# Patient Record
Sex: FEMALE | Race: BLACK OR AFRICAN AMERICAN | NOT HISPANIC OR LATINO | Employment: OTHER | URBAN - METROPOLITAN AREA
[De-identification: names, ages, dates, MRNs, and addresses within clinical notes are randomized per-mention and may not be internally consistent; named-entity substitution may affect disease eponyms.]

---

## 2019-01-31 ENCOUNTER — HOSPITAL ENCOUNTER (EMERGENCY)
Facility: HOSPITAL | Age: 74
End: 2019-01-31
Attending: EMERGENCY MEDICINE
Payer: MEDICARE

## 2019-01-31 VITALS
HEART RATE: 91 BPM | OXYGEN SATURATION: 98 % | DIASTOLIC BLOOD PRESSURE: 69 MMHG | WEIGHT: 178.81 LBS | TEMPERATURE: 100 F | RESPIRATION RATE: 20 BRPM | SYSTOLIC BLOOD PRESSURE: 142 MMHG

## 2019-01-31 DIAGNOSIS — K85.90 PANCREATITIS: ICD-10-CM

## 2019-01-31 DIAGNOSIS — R10.9 ABDOMINAL PAIN: ICD-10-CM

## 2019-01-31 DIAGNOSIS — R65.10 SIRS (SYSTEMIC INFLAMMATORY RESPONSE SYNDROME): ICD-10-CM

## 2019-01-31 DIAGNOSIS — K80.21 CALCULUS OF GALLBLADDER WITH BILIARY OBSTRUCTION BUT WITHOUT CHOLECYSTITIS: Primary | ICD-10-CM

## 2019-01-31 LAB
ALBUMIN SERPL BCP-MCNC: 4 G/DL
ALP SERPL-CCNC: 105 U/L
ALT SERPL W/O P-5'-P-CCNC: 12 U/L
AMYLASE SERPL-CCNC: 508 U/L
ANION GAP SERPL CALC-SCNC: 15 MMOL/L
AST SERPL-CCNC: 18 U/L
BACTERIA #/AREA URNS AUTO: NORMAL /HPF
BASOPHILS # BLD AUTO: 0.03 K/UL
BASOPHILS NFR BLD: 0.1 %
BILIRUB SERPL-MCNC: 0.7 MG/DL
BILIRUB UR QL STRIP: NEGATIVE
BUN SERPL-MCNC: 11 MG/DL
CALCIUM SERPL-MCNC: 9.9 MG/DL
CHLORIDE SERPL-SCNC: 104 MMOL/L
CLARITY UR REFRACT.AUTO: CLEAR
CO2 SERPL-SCNC: 21 MMOL/L
COLOR UR AUTO: YELLOW
CREAT SERPL-MCNC: 0.8 MG/DL
DIFFERENTIAL METHOD: ABNORMAL
EOSINOPHIL # BLD AUTO: 0.1 K/UL
EOSINOPHIL NFR BLD: 0.4 %
ERYTHROCYTE [DISTWIDTH] IN BLOOD BY AUTOMATED COUNT: 17.4 %
EST. GFR  (AFRICAN AMERICAN): >60 ML/MIN/1.73 M^2
EST. GFR  (NON AFRICAN AMERICAN): >60 ML/MIN/1.73 M^2
GLUCOSE SERPL-MCNC: 107 MG/DL
GLUCOSE UR QL STRIP: NEGATIVE
HCT VFR BLD AUTO: 35.4 %
HGB BLD-MCNC: 11.2 G/DL
HGB UR QL STRIP: ABNORMAL
KETONES UR QL STRIP: ABNORMAL
LACTATE SERPL-SCNC: 1 MMOL/L
LEUKOCYTE ESTERASE UR QL STRIP: ABNORMAL
LIPASE SERPL-CCNC: 309 U/L
LYMPHOCYTES # BLD AUTO: 2.6 K/UL
LYMPHOCYTES NFR BLD: 12.5 %
MCH RBC QN AUTO: 23.4 PG
MCHC RBC AUTO-ENTMCNC: 31.6 G/DL
MCV RBC AUTO: 74 FL
MICROSCOPIC COMMENT: NORMAL
MONOCYTES # BLD AUTO: 1.5 K/UL
MONOCYTES NFR BLD: 7.3 %
NEUTROPHILS # BLD AUTO: 16.3 K/UL
NEUTROPHILS NFR BLD: 79.4 %
NITRITE UR QL STRIP: NEGATIVE
PH UR STRIP: 6 [PH] (ref 5–8)
PLATELET # BLD AUTO: 513 K/UL
PMV BLD AUTO: 10.4 FL
POTASSIUM SERPL-SCNC: 3.1 MMOL/L
PROT SERPL-MCNC: 8.2 G/DL
PROT UR QL STRIP: ABNORMAL
RBC # BLD AUTO: 4.79 M/UL
RBC #/AREA URNS AUTO: 1 /HPF (ref 0–4)
SODIUM SERPL-SCNC: 140 MMOL/L
SP GR UR STRIP: 1.01 (ref 1–1.03)
SQUAMOUS #/AREA URNS AUTO: 7 /HPF
TROPONIN I SERPL DL<=0.01 NG/ML-MCNC: <0.006 NG/ML
URN SPEC COLLECT METH UR: ABNORMAL
UROBILINOGEN UR STRIP-ACNC: NEGATIVE EU/DL
WBC # BLD AUTO: 20.5 K/UL
WBC #/AREA URNS AUTO: 5 /HPF (ref 0–5)

## 2019-01-31 PROCEDURE — 99285 EMERGENCY DEPT VISIT HI MDM: CPT | Mod: 25,ER

## 2019-01-31 PROCEDURE — 80053 COMPREHEN METABOLIC PANEL: CPT | Mod: ER

## 2019-01-31 PROCEDURE — 25500020 PHARM REV CODE 255: Mod: ER | Performed by: EMERGENCY MEDICINE

## 2019-01-31 PROCEDURE — 83690 ASSAY OF LIPASE: CPT | Mod: ER

## 2019-01-31 PROCEDURE — 25000003 PHARM REV CODE 250: Mod: ER | Performed by: PHYSICIAN ASSISTANT

## 2019-01-31 PROCEDURE — 93005 ELECTROCARDIOGRAM TRACING: CPT | Mod: ER

## 2019-01-31 PROCEDURE — 96361 HYDRATE IV INFUSION ADD-ON: CPT | Mod: ER

## 2019-01-31 PROCEDURE — 93010 ELECTROCARDIOGRAM REPORT: CPT | Mod: ,,, | Performed by: INTERNAL MEDICINE

## 2019-01-31 PROCEDURE — 85025 COMPLETE CBC W/AUTO DIFF WBC: CPT | Mod: ER

## 2019-01-31 PROCEDURE — 81000 URINALYSIS NONAUTO W/SCOPE: CPT | Mod: ER

## 2019-01-31 PROCEDURE — 87040 BLOOD CULTURE FOR BACTERIA: CPT

## 2019-01-31 PROCEDURE — 96365 THER/PROPH/DIAG IV INF INIT: CPT | Mod: ER,59

## 2019-01-31 PROCEDURE — 84484 ASSAY OF TROPONIN QUANT: CPT | Mod: ER

## 2019-01-31 PROCEDURE — 93010 EKG 12-LEAD: ICD-10-PCS | Mod: ,,, | Performed by: INTERNAL MEDICINE

## 2019-01-31 PROCEDURE — 82150 ASSAY OF AMYLASE: CPT | Mod: ER

## 2019-01-31 PROCEDURE — 83605 ASSAY OF LACTIC ACID: CPT | Mod: ER

## 2019-01-31 PROCEDURE — 63600175 PHARM REV CODE 636 W HCPCS: Mod: ER | Performed by: PHYSICIAN ASSISTANT

## 2019-01-31 RX ORDER — SODIUM CHLORIDE 9 MG/ML
1000 INJECTION, SOLUTION INTRAVENOUS
Status: COMPLETED | OUTPATIENT
Start: 2019-01-31 | End: 2019-01-31

## 2019-01-31 RX ORDER — FAMOTIDINE 20 MG/1
20 TABLET, FILM COATED ORAL 2 TIMES DAILY
COMMUNITY

## 2019-01-31 RX ORDER — OMEPRAZOLE 20 MG/1
20 CAPSULE, DELAYED RELEASE ORAL DAILY
COMMUNITY

## 2019-01-31 RX ORDER — HYDROCHLOROTHIAZIDE 12.5 MG/1
12.5 CAPSULE ORAL DAILY
COMMUNITY

## 2019-01-31 RX ORDER — SIMVASTATIN 20 MG/1
20 TABLET, FILM COATED ORAL NIGHTLY
COMMUNITY

## 2019-01-31 RX ORDER — SERTRALINE HYDROCHLORIDE 25 MG/1
25 TABLET, FILM COATED ORAL DAILY
COMMUNITY

## 2019-01-31 RX ORDER — LISINOPRIL 5 MG/1
5 TABLET ORAL DAILY
COMMUNITY

## 2019-01-31 RX ADMIN — CEFTRIAXONE 1 G: 1 INJECTION, SOLUTION INTRAVENOUS at 02:01

## 2019-01-31 RX ADMIN — IOHEXOL 75 ML: 350 INJECTION, SOLUTION INTRAVENOUS at 04:01

## 2019-01-31 RX ADMIN — SODIUM CHLORIDE 1000 ML: 0.9 INJECTION, SOLUTION INTRAVENOUS at 01:01

## 2019-01-31 RX ADMIN — IOHEXOL 30 ML: 350 INJECTION, SOLUTION INTRAVENOUS at 04:01

## 2019-01-31 RX ADMIN — SODIUM CHLORIDE 1000 ML: 0.9 INJECTION, SOLUTION INTRAVENOUS at 06:01

## 2019-01-31 NOTE — ED PROVIDER NOTES
Encounter Date: 1/31/2019       History     Chief Complaint   Patient presents with    Back Pain     lower back pain that radiates into her stomach. just finished taking cefuroxime for UTI/kidney infection     72 yo with lower abdominal pain, back pain, and dysuria for several days. Treated several times for urinary tract infection. Last tx with Ceftin. Pain is intermittent and moderate. Denies chest pain or shortness of breath but has increased abdominal pain with deep inspiration          Review of patient's allergies indicates:  No Known Allergies  Past Medical History:   Diagnosis Date    Anxiety     GERD (gastroesophageal reflux disease)     Hypercholesteremia     Hypertension      Past Surgical History:   Procedure Laterality Date    TOTAL KNEE ARTHROPLASTY       No family history on file.  Social History     Tobacco Use    Smoking status: Never Smoker   Substance Use Topics    Alcohol use: No     Frequency: Never    Drug use: No     Review of Systems   Constitutional: Negative for fever.   HENT: Negative for sore throat.    Respiratory: Negative for shortness of breath.    Cardiovascular: Negative for chest pain.   Gastrointestinal: Positive for abdominal pain. Negative for nausea.   Genitourinary: Negative for dysuria.   Musculoskeletal: Positive for back pain.   Skin: Negative for rash.   Neurological: Negative for weakness.   Hematological: Does not bruise/bleed easily.       Physical Exam     Initial Vitals [01/31/19 1247]   BP Pulse Resp Temp SpO2   130/73 105 20 97.8 °F (36.6 °C) 99 %      MAP       --         Vitals:    01/31/19 1247 01/31/19 1518 01/31/19 2002   BP: 130/73 130/72 (!) 142/69   Pulse: 105 81 91   Resp: 20 20 16   Temp: 97.8 °F (36.6 °C) 98.4 °F (36.9 °C) 99.5 °F (37.5 °C)   TempSrc: Oral Oral Oral   SpO2: 99% 99% 98%   Weight: 81.1 kg (178 lb 12.7 oz)           Physical Exam    Nursing note and vitals reviewed.  Constitutional: She appears well-developed and well-nourished. No  distress.   HENT:   Head: Normocephalic and atraumatic.   Eyes: Conjunctivae and EOM are normal. Pupils are equal, round, and reactive to light.   Neck: Normal range of motion.   Cardiovascular: Normal rate, regular rhythm and normal heart sounds.   Pulmonary/Chest: Breath sounds normal. She has no wheezes. She has no rhonchi. She has no rales. She exhibits no tenderness.   Abdominal: Soft. Bowel sounds are normal. There is tenderness (suprapubic tenderness).   Musculoskeletal: Normal range of motion.   Neurological: She is alert and oriented to person, place, and time. She has normal strength.   Skin: Skin is warm and dry. No rash noted.   Psychiatric: She has a normal mood and affect. Thought content normal.         ED Course   Procedures  Labs Reviewed   URINALYSIS, REFLEX TO URINE CULTURE - Abnormal; Notable for the following components:       Result Value    Protein, UA Trace (*)     Ketones, UA 2+ (*)     Occult Blood UA Trace (*)     Leukocytes, UA 1+ (*)     All other components within normal limits    Narrative:     Preferred Collection Type->Urine, Clean Catch   CBC W/ AUTO DIFFERENTIAL - Abnormal; Notable for the following components:    WBC 20.50 (*)     Hemoglobin 11.2 (*)     Hematocrit 35.4 (*)     MCV 74 (*)     MCH 23.4 (*)     MCHC 31.6 (*)     RDW 17.4 (*)     Platelets 513 (*)     Gran # (ANC) 16.3 (*)     Mono # 1.5 (*)     Gran% 79.4 (*)     Lymph% 12.5 (*)     All other components within normal limits   COMPREHENSIVE METABOLIC PANEL - Abnormal; Notable for the following components:    Potassium 3.1 (*)     CO2 21 (*)     All other components within normal limits   AMYLASE - Abnormal; Notable for the following components:    Amylase 508 (*)     All other components within normal limits   LIPASE - Abnormal; Notable for the following components:    Lipase 309 (*)     All other components within normal limits   CULTURE, BLOOD   CULTURE, BLOOD   URINALYSIS MICROSCOPIC    Narrative:     Preferred  Collection Type->Urine, Clean Catch   TROPONIN I   LACTIC ACID, PLASMA        ECG Results          EKG 12-lead (In process)  Result time 01/31/19 13:38:16    In process by Interface, Lab In [facility] (01/31/19 13:38:16)                 Narrative:    Test Reason : R10.9,    Vent. Rate : 099 BPM     Atrial Rate : 099 BPM     P-R Int : 128 ms          QRS Dur : 064 ms      QT Int : 360 ms       P-R-T Axes : 025 044 017 degrees     QTc Int : 462 ms    Normal sinus rhythm  Normal ECG  No previous ECGs available    Referred By: [email or web address]   SELF           Confirmed By:                   In process by Interface, Lab In [facility] (01/31/19 13:37:08)                 Narrative:    Test Reason : R10.9,    Vent. Rate : 099 BPM     Atrial Rate : 099 BPM     P-R Int : 128 ms          QRS Dur : 064 ms      QT Int : 360 ms       P-R-T Axes : 025 044 017 degrees     QTc Int : 462 ms    Normal sinus rhythm  Normal ECG  No previous ECGs available    Referred By: [email or web address]   SELF           Confirmed By:                             Imaging Results          US Abdomen Limited (Final result)  Result time 01/31/19 17:58:50   Procedure changed from US Abdomen Complete     Final result by Alondra Martinez MD (01/31/19 17:58:50)                 Impression:      Cholelithiasis with a dilated common bile duct to 9 mm.  This can be seen with choledocholithiasis although an obstructing stone is not seen on this exam.  Recommend clinical correlation.  MRCP may be helpful for further evaluation.      Electronically signed by: Alondra Martinez MD  Date:    01/31/2019  Time:    17:58             Narrative:    EXAMINATION:  US ABDOMEN LIMITED    CLINICAL HISTORY:  pancreatitis; Acute pancreatitis without necrosis or infection, unspecified    TECHNIQUE:  Limited ultrasound of the right upper quadrant of the abdomen (including pancreas, liver, gallbladder, common bile duct, and right kidney) was  performed.    COMPARISON:  None.    FINDINGS:  Liver: Normal in size, . Homogeneous echotexture. No focal hepatic lesions.    Gallbladder: Cholelithiasis.  No evidence of acute cholecystitis.    Biliary system: The common duct is dilated to 9 mm.  No intrahepatic ductal dilatation.    Right kidney: Normal in size with no hydronephrosis.    Miscellaneous: No upper abdominal ascites.                               CT Abdomen Pelvis With Contrast (Final result)  Result time 01/31/19 16:29:38    Final result by TETE Meek Sr., MD (01/31/19 16:29:38)                 Impression:      1. Acute pancreatitis.  There are moderate inflammatory changes in and adjacent to the pancreas.  2. There is a moderate size hiatal hernia.  All CT scans at this facility use dose modulation, iterative reconstruction, and/or weight base dosing when appropriate to reduce radiation dose when appropriate to reduce radiation dose to as low as reasonably achievable.      Electronically signed by: Todd Meek MD  Date:    01/31/2019  Time:    16:29             Narrative:    EXAMINATION:  CT ABDOMEN PELVIS WITH CONTRAST    CLINICAL HISTORY:  Abdominal pain, unspecified;pain;    TECHNIQUE:  Standard abdomen and pelvis CT protocol with oral and IV contrast was performed.  75 mL of Omnipaque 350 contrast material was used for this examination.    FINDINGS:  Finding: The size of the heart is within normal limits. The lungs are clear. There is no pneumothorax or pleural effusion.    There are moderate inflammatory changes in and adjacent to the pancreas.  The liver, gallbladder, spleen, adrenals, and kidneys are normal in appearance. The ureters and the urinary bladder are normal in appearance. The appendix is normal in appearance.  There is a moderate size hiatal hernia.  There is a small amount of free fluid in the pelvis.  There is no pneumoperitoneum.                               X-Ray Chest PA And Lateral (Final result)  Result time  01/31/19 13:24:08    Final result by TETE Meek Sr., MD (01/31/19 13:24:08)                 Impression:      1. The lungs are clear.  2. There are findings characteristic of a small hiatal hernia.  3. There are mild degenerative changes in the thoracic spine.  4. There is a mild amount of dextroconvex curvature of the thoracic spine.  .      Electronically signed by: Todd Meek MD  Date:    01/31/2019  Time:    13:24             Narrative:    EXAMINATION:  XR CHEST PA AND LATERAL    CLINICAL HISTORY:  Unspecified abdominal pain    COMPARISON:  None    FINDINGS:  The size and contour of the heart are normal. The lungs are clear.  There are findings characteristic of a small hiatal hernia.  There is no pneumothorax or pleural effusion.  There is a mild amount of dextroconvex curvature of the thoracic spine.  There are mild degenerative changes in the thoracic spine.                                 Medical Decision Making:   ED Management:  Patient was seen and examined in conjunction with the nurse practitioner.  The patient's abdomen is soft and nondistended but markedly tender on my examination. Review of lab work and imaging demonstrates pancreatitis likely biliary in origin given common bile duct dilatation and cholelithiasis.  Accordingly the patient will need hospitalization for IV fluids, bowel rest, and GI consultation for likely ERCP at some point.    All historical, clinical, radiographic, and laboratory findings were reviewed with the patient/family in detail along with the indications for transfer to an outside facility (rather than admission to our facility in [place]) secondary to patient preference and a need for hsospitalization and GI consultation given the diagnosis of cholelithiasis with suspected biliary pancreatitis and subsequent SIRS.  All remaining questions and concerns were addressed at that time and the patient/family communicates understanding and agrees to proceed  accordingly.  Similarly all pertinent details of the encounter were discussed with Dr Roberts at [facility] via the C who agrees to accept the patient in transfer based on the needs/patient preferences outlined above.  Patient will be transferred by [facility] secondary to a need for ongoing cardiac monitoring and IVF en route.  William Samaniego MD  8:13 PM                        Clinical Impression:   The primary encounter diagnosis was Calculus of gallbladder with biliary obstruction but without cholecystitis. Diagnoses of Abdominal pain, Pancreatitis, and SIRS (systemic inflammatory response syndrome) were also pertinent to this visit.                             William Samaniego MD  01/31/19 2017

## 2019-02-01 NOTE — ED NOTES
Pt resting comfortably. Updated on poc. Ambulated to restroom without assistance. Reports 4/10 intermittent back spasms. No abd pain or nausea at this time. Offered pain medication but prefers not to have anything at time.

## 2019-02-01 NOTE — ED NOTES
Transfer center notified that pt accepted by Dr. Sandro Saldivar at [facility]. Contact [phone] for report. Transfer center arranging for transport at this time.

## 2019-02-06 LAB
BACTERIA BLD CULT: NORMAL
BACTERIA BLD CULT: NORMAL

## 2019-03-30 ENCOUNTER — HOSPITAL ENCOUNTER (EMERGENCY)
Facility: HOSPITAL | Age: 74
Discharge: HOME | End: 2019-03-30
Attending: EMERGENCY MEDICINE
Payer: MEDICARE

## 2019-03-30 VITALS
HEART RATE: 67 BPM | HEIGHT: 64 IN | WEIGHT: 177.5 LBS | BODY MASS INDEX: 30.3 KG/M2 | OXYGEN SATURATION: 100 % | DIASTOLIC BLOOD PRESSURE: 78 MMHG | SYSTOLIC BLOOD PRESSURE: 138 MMHG | TEMPERATURE: 98 F | RESPIRATION RATE: 18 BRPM

## 2019-03-30 DIAGNOSIS — N39.0 URINARY TRACT INFECTION WITHOUT HEMATURIA, SITE UNSPECIFIED: Primary | ICD-10-CM

## 2019-03-30 LAB
BACTERIA #/AREA URNS AUTO: ABNORMAL /HPF
BILIRUB UR QL STRIP: NEGATIVE
CLARITY UR REFRACT.AUTO: ABNORMAL
COLOR UR AUTO: YELLOW
GLUCOSE UR QL STRIP: NEGATIVE
HGB UR QL STRIP: NEGATIVE
KETONES UR QL STRIP: NEGATIVE
LEUKOCYTE ESTERASE UR QL STRIP: ABNORMAL
MICROSCOPIC COMMENT: ABNORMAL
NITRITE UR QL STRIP: NEGATIVE
PH UR STRIP: 7 [PH] (ref 5–8)
PROT UR QL STRIP: NEGATIVE
RBC #/AREA URNS AUTO: 2 /HPF (ref 0–4)
SP GR UR STRIP: 1.01 (ref 1–1.03)
SQUAMOUS #/AREA URNS AUTO: 4 /HPF
URN SPEC COLLECT METH UR: ABNORMAL
UROBILINOGEN UR STRIP-ACNC: NEGATIVE EU/DL
WBC #/AREA URNS AUTO: 6 /HPF (ref 0–5)

## 2019-03-30 PROCEDURE — 81000 URINALYSIS NONAUTO W/SCOPE: CPT | Mod: ER

## 2019-03-30 PROCEDURE — 99283 EMERGENCY DEPT VISIT LOW MDM: CPT | Mod: ER

## 2019-03-30 PROCEDURE — 25000003 PHARM REV CODE 250: Mod: ER | Performed by: EMERGENCY MEDICINE

## 2019-03-30 RX ORDER — CIPROFLOXACIN 500 MG/1
500 TABLET ORAL
Status: COMPLETED | OUTPATIENT
Start: 2019-03-30 | End: 2019-03-30

## 2019-03-30 RX ORDER — CIPROFLOXACIN 500 MG/1
500 TABLET ORAL 2 TIMES DAILY
Qty: 13 TABLET | Refills: 0 | Status: SHIPPED | OUTPATIENT
Start: 2019-03-30 | End: 2019-04-06

## 2019-03-30 RX ADMIN — CIPROFLOXACIN HYDROCHLORIDE 500 MG: 500 TABLET, FILM COATED ORAL at 10:03

## 2019-03-30 NOTE — ED PROVIDER NOTES
Encounter Date: 3/30/2019       History     Chief Complaint   Patient presents with    Back Pain     Onset Monday. Lower back pain. REports feels like when she had a kidney infection. Denies dysuria. Pain worse with standing up.      Right low back pain for about 6 days, feels like previous kidney infection.  Has never had a kidney stone.  No actual dysuria, hematuria, frequency, fever, or other specific urinary complaints.  No fall, lifting, straining, or other musculoskeletal injury or likely causative factor identified.  Denies abdominal pain, nausea, vomiting, diarrhea, or other complaints.    The history is provided by the patient and a relative. No  was used.     Review of patient's allergies indicates:  No Known Allergies  Past Medical History:   Diagnosis Date    Anxiety     GERD (gastroesophageal reflux disease)     Hypercholesteremia     Hypertension      Past Surgical History:   Procedure Laterality Date    TOTAL KNEE ARTHROPLASTY       History reviewed. No pertinent family history.  Social History     Tobacco Use    Smoking status: Never Smoker   Substance Use Topics    Alcohol use: No     Frequency: Never    Drug use: No     Review of Systems   Constitutional: Negative for activity change, fatigue and fever.   HENT: Negative for congestion, ear pain, facial swelling, nosebleeds, sinus pressure and sore throat.    Eyes: Negative for pain, discharge, redness and visual disturbance.   Respiratory: Negative for cough, choking, chest tightness, shortness of breath and wheezing.    Cardiovascular: Negative for chest pain, palpitations and leg swelling.   Gastrointestinal: Negative for abdominal distention, abdominal pain, nausea and vomiting.   Endocrine: Negative for heat intolerance, polydipsia and polyuria.   Genitourinary: Negative for difficulty urinating, dysuria, flank pain, hematuria and urgency.   Musculoskeletal: Positive for back pain. Negative for gait problem, joint  swelling and myalgias.   Skin: Negative for color change and rash.   Allergic/Immunologic: Negative for environmental allergies and food allergies.   Neurological: Negative for dizziness, weakness, numbness and headaches.   Hematological: Negative for adenopathy. Does not bruise/bleed easily.   Psychiatric/Behavioral: Negative for agitation and behavioral problems. The patient is not nervous/anxious.    All other systems reviewed and are negative.      Physical Exam     Initial Vitals [03/30/19 0944]   BP Pulse Resp Temp SpO2   (!) 157/72 82 18 97.7 °F (36.5 °C) 97 %      MAP       --         Physical Exam    Nursing note and vitals reviewed.  Constitutional: She appears well-developed and well-nourished. She is not diaphoretic. No distress.   HENT:   Head: Normocephalic and atraumatic.   Mouth/Throat: No oropharyngeal exudate.   Eyes: Conjunctivae and EOM are normal. Pupils are equal, round, and reactive to light. Right eye exhibits no discharge. Left eye exhibits no discharge. No scleral icterus.   Neck: Normal range of motion. Neck supple. No thyromegaly present. No tracheal deviation present. No JVD present.   Cardiovascular: Normal rate, regular rhythm, normal heart sounds and intact distal pulses. Exam reveals no gallop and no friction rub.    No murmur heard.  Pulmonary/Chest: Breath sounds normal. No stridor. No respiratory distress. She has no wheezes. She has no rhonchi. She has no rales. She exhibits no tenderness.   Abdominal: Soft. Bowel sounds are normal. She exhibits no distension and no mass. There is tenderness. There is no rebound and no guarding.   Mild right low flank/ low back tenderness to percussion; no other findings   Musculoskeletal: Normal range of motion. She exhibits no edema or tenderness.   Indicates right low back pain as above but no midline tenderness, muscular tenderness, pain on range of motion, or other musculoskeletal findings.  Negative straight leg raise bilaterally.    Neurological: She is alert and oriented to person, place, and time. She has normal strength.   Skin: Skin is warm and dry. No rash and no abscess noted. No erythema.   Psychiatric: She has a normal mood and affect. Her behavior is normal. Judgment and thought content normal.         ED Course   Procedures  Labs Reviewed   URINALYSIS, REFLEX TO URINE CULTURE - Abnormal; Notable for the following components:       Result Value    Appearance, UA Hazy (*)     Leukocytes, UA 3+ (*)     All other components within normal limits    Narrative:     Preferred Collection Type->Urine, Clean Catch   URINALYSIS MICROSCOPIC - Abnormal; Notable for the following components:    WBC, UA 6 (*)     Bacteria, UA Moderate (*)     All other components within normal limits    Narrative:     Preferred Collection Type->Urine, Clean Catch          Imaging Results    None                               Clinical Impression:     1. Urinary tract infection without hematuria, site unspecified         Disposition:   Disposition: Discharged  Condition: Stable                        Ethan Mendoza MD  03/30/19 1038

## 2024-08-12 ENCOUNTER — OFFICE VISIT (OUTPATIENT)
Dept: INTERNAL MEDICINE | Facility: CLINIC | Age: 79
End: 2024-08-12
Payer: MEDICARE

## 2024-08-12 VITALS
BODY MASS INDEX: 28.34 KG/M2 | SYSTOLIC BLOOD PRESSURE: 100 MMHG | HEIGHT: 64 IN | HEART RATE: 104 BPM | OXYGEN SATURATION: 96 % | WEIGHT: 166 LBS | DIASTOLIC BLOOD PRESSURE: 60 MMHG | TEMPERATURE: 98 F

## 2024-08-12 DIAGNOSIS — R41.3 MEMORY LOSS: ICD-10-CM

## 2024-08-12 DIAGNOSIS — M54.50 CHRONIC BILATERAL LOW BACK PAIN WITHOUT SCIATICA: ICD-10-CM

## 2024-08-12 DIAGNOSIS — F32.A DEPRESSION, UNSPECIFIED DEPRESSION TYPE: ICD-10-CM

## 2024-08-12 DIAGNOSIS — R79.9 ABNORMAL FINDING OF BLOOD CHEMISTRY, UNSPECIFIED: ICD-10-CM

## 2024-08-12 DIAGNOSIS — R06.09 DYSPNEA ON EXERTION: ICD-10-CM

## 2024-08-12 DIAGNOSIS — Z76.89 ENCOUNTER TO ESTABLISH CARE: Primary | ICD-10-CM

## 2024-08-12 DIAGNOSIS — G89.29 CHRONIC BILATERAL LOW BACK PAIN WITHOUT SCIATICA: ICD-10-CM

## 2024-08-12 DIAGNOSIS — Z78.0 ASYMPTOMATIC MENOPAUSAL STATE: ICD-10-CM

## 2024-08-12 DIAGNOSIS — Z12.31 ENCOUNTER FOR SCREENING MAMMOGRAM FOR MALIGNANT NEOPLASM OF BREAST: ICD-10-CM

## 2024-08-12 DIAGNOSIS — Z12.11 SCREENING FOR COLON CANCER: ICD-10-CM

## 2024-08-12 DIAGNOSIS — Z13.820 SCREENING FOR OSTEOPOROSIS: ICD-10-CM

## 2024-08-12 PROCEDURE — 1125F AMNT PAIN NOTED PAIN PRSNT: CPT | Mod: CPTII,S$GLB,, | Performed by: STUDENT IN AN ORGANIZED HEALTH CARE EDUCATION/TRAINING PROGRAM

## 2024-08-12 PROCEDURE — 3074F SYST BP LT 130 MM HG: CPT | Mod: CPTII,S$GLB,, | Performed by: STUDENT IN AN ORGANIZED HEALTH CARE EDUCATION/TRAINING PROGRAM

## 2024-08-12 PROCEDURE — 99999 PR PBB SHADOW E&M-NEW PATIENT-LVL V: CPT | Mod: PBBFAC,,, | Performed by: STUDENT IN AN ORGANIZED HEALTH CARE EDUCATION/TRAINING PROGRAM

## 2024-08-12 PROCEDURE — 3288F FALL RISK ASSESSMENT DOCD: CPT | Mod: CPTII,S$GLB,, | Performed by: STUDENT IN AN ORGANIZED HEALTH CARE EDUCATION/TRAINING PROGRAM

## 2024-08-12 PROCEDURE — 3078F DIAST BP <80 MM HG: CPT | Mod: CPTII,S$GLB,, | Performed by: STUDENT IN AN ORGANIZED HEALTH CARE EDUCATION/TRAINING PROGRAM

## 2024-08-12 PROCEDURE — 1159F MED LIST DOCD IN RCRD: CPT | Mod: CPTII,S$GLB,, | Performed by: STUDENT IN AN ORGANIZED HEALTH CARE EDUCATION/TRAINING PROGRAM

## 2024-08-12 PROCEDURE — 1101F PT FALLS ASSESS-DOCD LE1/YR: CPT | Mod: CPTII,S$GLB,, | Performed by: STUDENT IN AN ORGANIZED HEALTH CARE EDUCATION/TRAINING PROGRAM

## 2024-08-12 PROCEDURE — 99214 OFFICE O/P EST MOD 30 MIN: CPT | Mod: S$GLB,,, | Performed by: STUDENT IN AN ORGANIZED HEALTH CARE EDUCATION/TRAINING PROGRAM

## 2024-08-12 RX ORDER — MELOXICAM 15 MG/1
15 TABLET ORAL
COMMUNITY
Start: 2024-08-05

## 2024-08-12 RX ORDER — SERTRALINE HYDROCHLORIDE 25 MG/1
25 TABLET, FILM COATED ORAL DAILY
Qty: 30 TABLET | Refills: 2 | Status: SHIPPED | OUTPATIENT
Start: 2024-08-12 | End: 2024-11-10

## 2024-08-12 RX ORDER — SIMVASTATIN 20 MG/1
20 TABLET, FILM COATED ORAL NIGHTLY
Qty: 90 TABLET | Refills: 0 | Status: SHIPPED | OUTPATIENT
Start: 2024-08-12 | End: 2024-11-10

## 2024-08-12 NOTE — PROGRESS NOTES
Chief Complaint   Patient presents with    Establish Care    Back Pain     HPI: Tessa Cardenas is a 79 y.o. female  with Pmhx listed below who presents to clinic for establishing [facility]. She is here with her niece today for multiple complains listed below  Chronic back pain:  As per the patient, she has been having chronic back pain present in her lower lumbar region for last several years.  She reports the pain to be dull aching, nonradiating which gets aggravated on movement and gets partial relieved on meloxicam.  She denies having any prior history of fall, injury or trauma.  I do not have any imaging done on this regard that is on the chart.  She has not had any physical therapy done yet.  She denies having any numbness, weakness, tingling sensation of her lower extremities, she denies having loss of control over bowel and bladder movement.  Tremors:  She has been having shaking of her bilateral upper extremities for last several years.  During this time she reports that she has also been experiencing decline in memories.  She has not been evaluated any of those yet.  On examination today, no bradykinesia, rigidity is seen    3. Dyspnea on exertion:  As per the patient, she reports to have dyspnea when she walks 1st her requiring her to take rest.  She is a nonsmoker denies taking drugs or alcohol.  She denies having orthopnea, PND, leg swelling, chest, palpitation at present.  She has never been to a doctor on a regular basis.     Problem List:  There is no problem list on file for this patient.      ROS: Negative except as noted above.       Current Meds:  Current Outpatient Medications   Medication Sig Dispense Refill    meloxicam (MOBIC) 15 MG tablet Take 15 mg by mouth. (Patient not taking: Reported on 8/12/2024)      sertraline (ZOLOFT) 25 MG tablet Take 1 tablet (25 mg total) by mouth once daily. 30 tablet 2    simvastatin (ZOCOR) 20 MG tablet Take 1 tablet (20 mg total) by mouth every evening. 90 tablet 0      No current facility-administered medications for this visit.      PE:  BP: 100/60  Pulse: 104     Temp: 98.3 °F (36.8 °C)  Weight: 75.3 kg (166 lb 0.1 oz) Body mass index is 28.49 kg/m².    Wt Readings from Last 5 Encounters:   08/12/24 75.3 kg (166 lb 0.1 oz)   03/30/19 80.5 kg (177 lb 7.5 oz)   01/31/19 81.1 kg (178 lb 12.7 oz)     General appearance: alert and cooperative, not in acute distress  Head: normocephalic, without obvious abnormality, atraumatic  Eyes: conjunctivae/corneas clear. PERRL, EOM's intact.  Ears: clear tympanic membranes   Neck: no adenopathy, supple, symmetrical, trachea midline and thyroid not enlarged, symmetric, no tenderness/mass/nodules, no JVD  Throat: lips, mucosa, and tongue normal; teeth and gums normal; no thrush  Chest: no reproducible chest pain   Heart: regular rate and rhythm, S1, S2 normal, soft systolic murmur noted.  Lungs: unlabored respiration, bilateral equal air entry, normal vesicular breath sound heard, no wheezing, rhonchi   Abdomen: soft, non-tender, non-distended; bowel sounds +; no masses,  no organomegaly, no ascites   Extremities: normal, atraumatic, no cyanosis or edema noted B/L upper and lower extremities.  Skin: skin color, texture, turgor normal. No rashes or lesions noted.  Neurologic: grossly intact      Lab:  Lab Results   Component Value Date    WBC 20.50 (H) 01/31/2019    HGB 11.2 (L) 01/31/2019    HCT 35.4 (L) 01/31/2019    MCV 74 (L) 01/31/2019     (H) 01/31/2019     01/31/2019    K 3.1 (L) 01/31/2019     01/31/2019    CO2 21 (L) 01/31/2019    BUN 11 01/31/2019     01/31/2019    CALCIUM 9.9 01/31/2019    AST 18 01/31/2019    ALT 12 01/31/2019       Impression:    ICD-10-CM ICD-9-CM    1. Encounter to establish care  Z76.89 V65.8 CBC Auto Differential      Lipid Panel      Hemoglobin A1C      HIV 1/2 Ag/Ab (4th Gen)      Hepatitis C Antibody      simvastatin (ZOCOR) 20 MG tablet      2. Screening for colon cancer  Z12.11  V76.51 Cologuard Screening (Multitarget Stool DNA)      Cologuard Screening (Multitarget Stool DNA)      3. Encounter for screening mammogram for malignant neoplasm of breast  Z12.31 V76.12 Mammo Digital Screening Bilat w/ Godfrey      4. Screening for osteoporosis  Z13.820 V82.81 DXA Bone Density Axial Skeleton 1 or more sites      5. Memory loss  R41.3 780.93 Ambulatory referral/consult to Neurology      6. Depression, unspecified depression type  F32.A 311 Comprehensive Metabolic Panel      TSH      sertraline (ZOLOFT) 25 MG tablet      7. Dyspnea on exertion  R06.09 786.09 CBC Auto Differential      Echo      8. Abnormal finding of blood chemistry, unspecified  R79.9 790.6 Lipid Panel      Hemoglobin A1C      9. Asymptomatic menopausal state  Z78.0 V49.81 DXA Bone Density Axial Skeleton 1 or more sites      10. Chronic bilateral low back pain without sciatica  M54.50 724.2 X-Ray Lumbar Spine 2 Or 3 Views    G89.29 338.29       1. Encounter to establish care  - CBC Auto Differential; Future  - Lipid Panel; Future  - Hemoglobin A1C; Future  - HIV 1/2 Ag/Ab (4th Gen); Future  - Hepatitis C Antibody; Future  - simvastatin (ZOCOR) 20 MG tablet; Take 1 tablet (20 mg total) by mouth every evening.  Dispense: 90 tablet; Refill: 0    2. Screening for colon cancer  - Cologuard Screening (Multitarget Stool DNA); Future  - Cologuard Screening (Multitarget Stool DNA)    3. Encounter for screening mammogram for malignant neoplasm of breast  - Mammo Digital Screening Bilat w/ Godfrey; Future    4. Screening for osteoporosis  - DXA Bone Density Axial Skeleton 1 or more sites; Future    5. Memory loss  - Ambulatory referral/consult to Neurology; Future    6. Depression, unspecified depression type  - Comprehensive Metabolic Panel; Future  - TSH; Future  - sertraline (ZOLOFT) 25 MG tablet; Take 1 tablet (25 mg total) by mouth once daily.  Dispense: 30 tablet; Refill: 2    7. Dyspnea on exertion  - CBC Auto Differential; Future  - Echo;  Future    8. Abnormal finding of blood chemistry, unspecified  - Lipid Panel; Future  - Hemoglobin A1C; Future    9. Asymptomatic menopausal state  - DXA Bone Density Axial Skeleton 1 or more sites; Future    10. Chronic bilateral low back pain without sciatica  - X-Ray Lumbar Spine 2 Or 3 Views; Future      Future Appointments   Date Time Provider Department Center   8/13/2024  8:00 AM [facility] MAMMO1 [facility] [place]   8/13/2024  8:50 AM [facility] LABORATORY [facility] [place]   8/26/2024  7:45 AM CARDIOVASCULAR, [facility] SPECCPR [place]   8/26/2024  8:30 AM [facility] DEXA1 [facility] BONE [place]   11/12/2024  7:20 AM Diaz Mayer MD [facility] IM [place]   12/24/2024  1:00 PM Barbra Maynard NP [facility] NEURO [facility]       I spent a total of 38 minutes on the day of the visit.This includes face to face time and non-face to face time preparing to see the patient (eg, review of tests), obtaining and/or reviewing separately obtained history, documenting clinical information in the electronic or other health record, independently interpreting results and communicating results to the patient/family/caregiver, or care coordinator.  Visit today included increased complexity associated with the care of the episodic problem   addressed and managing the longitudinal care of the patient due to the serious and/or complex managed problem(s) .     Diaz Mayer MD

## 2024-08-13 ENCOUNTER — TELEPHONE (OUTPATIENT)
Dept: RADIOLOGY | Facility: HOSPITAL | Age: 79
End: 2024-08-13

## 2024-08-13 ENCOUNTER — HOSPITAL ENCOUNTER (OUTPATIENT)
Dept: RADIOLOGY | Facility: HOSPITAL | Age: 79
Discharge: HOME | End: 2024-08-13
Attending: STUDENT IN AN ORGANIZED HEALTH CARE EDUCATION/TRAINING PROGRAM
Payer: MEDICARE

## 2024-08-13 DIAGNOSIS — G89.29 CHRONIC BILATERAL LOW BACK PAIN WITHOUT SCIATICA: Primary | ICD-10-CM

## 2024-08-13 DIAGNOSIS — G89.29 CHRONIC BILATERAL LOW BACK PAIN WITHOUT SCIATICA: ICD-10-CM

## 2024-08-13 DIAGNOSIS — M54.50 CHRONIC BILATERAL LOW BACK PAIN WITHOUT SCIATICA: Primary | ICD-10-CM

## 2024-08-13 DIAGNOSIS — Z12.31 ENCOUNTER FOR SCREENING MAMMOGRAM FOR MALIGNANT NEOPLASM OF BREAST: ICD-10-CM

## 2024-08-13 DIAGNOSIS — M54.50 CHRONIC BILATERAL LOW BACK PAIN WITHOUT SCIATICA: ICD-10-CM

## 2024-08-13 PROCEDURE — 77063 BREAST TOMOSYNTHESIS BI: CPT | Mod: 26,,, | Performed by: RADIOLOGY

## 2024-08-13 PROCEDURE — 72100 X-RAY EXAM L-S SPINE 2/3 VWS: CPT | Mod: 26,,, | Performed by: RADIOLOGY

## 2024-08-13 PROCEDURE — 77067 SCR MAMMO BI INCL CAD: CPT | Mod: 26,,, | Performed by: RADIOLOGY

## 2024-08-13 PROCEDURE — 72100 X-RAY EXAM L-S SPINE 2/3 VWS: CPT | Mod: TC,PO

## 2024-08-13 PROCEDURE — 77067 SCR MAMMO BI INCL CAD: CPT | Mod: TC,PO

## 2024-08-20 ENCOUNTER — HOSPITAL ENCOUNTER (OUTPATIENT)
Dept: RADIOLOGY | Facility: HOSPITAL | Age: 79
Discharge: HOME | End: 2024-08-20
Attending: STUDENT IN AN ORGANIZED HEALTH CARE EDUCATION/TRAINING PROGRAM
Payer: MEDICARE

## 2024-08-20 DIAGNOSIS — R92.8 ABNORMAL MAMMOGRAM: ICD-10-CM

## 2024-08-20 PROCEDURE — 77061 BREAST TOMOSYNTHESIS UNI: CPT | Mod: 26,RT,, | Performed by: RADIOLOGY

## 2024-08-20 PROCEDURE — 77065 DX MAMMO INCL CAD UNI: CPT | Mod: TC,RT

## 2024-08-20 PROCEDURE — 76642 ULTRASOUND BREAST LIMITED: CPT | Mod: 26,RT,, | Performed by: RADIOLOGY

## 2024-08-20 PROCEDURE — 77061 BREAST TOMOSYNTHESIS UNI: CPT | Mod: TC,RT

## 2024-08-20 PROCEDURE — 76642 ULTRASOUND BREAST LIMITED: CPT | Mod: TC,RT

## 2024-08-20 PROCEDURE — 77065 DX MAMMO INCL CAD UNI: CPT | Mod: 26,RT,, | Performed by: RADIOLOGY

## 2024-08-26 ENCOUNTER — HOSPITAL ENCOUNTER (OUTPATIENT)
Dept: RADIOLOGY | Facility: HOSPITAL | Age: 79
Discharge: HOME | End: 2024-08-26
Attending: STUDENT IN AN ORGANIZED HEALTH CARE EDUCATION/TRAINING PROGRAM
Payer: MEDICARE

## 2024-08-26 ENCOUNTER — HOSPITAL ENCOUNTER (OUTPATIENT)
Dept: CARDIOLOGY | Facility: HOSPITAL | Age: 79
Discharge: HOME | End: 2024-08-26
Attending: STUDENT IN AN ORGANIZED HEALTH CARE EDUCATION/TRAINING PROGRAM
Payer: MEDICARE

## 2024-08-26 VITALS
HEART RATE: 80 BPM | DIASTOLIC BLOOD PRESSURE: 60 MMHG | WEIGHT: 166 LBS | HEIGHT: 64 IN | BODY MASS INDEX: 28.34 KG/M2 | SYSTOLIC BLOOD PRESSURE: 100 MMHG

## 2024-08-26 DIAGNOSIS — Z78.0 ASYMPTOMATIC MENOPAUSAL STATE: ICD-10-CM

## 2024-08-26 DIAGNOSIS — Z13.820 SCREENING FOR OSTEOPOROSIS: ICD-10-CM

## 2024-08-26 DIAGNOSIS — R06.09 DYSPNEA ON EXERTION: ICD-10-CM

## 2024-08-26 LAB
AORTIC ROOT ANNULUS: 2.67 CM
AORTIC VALVE CUSP SEPERATION: 0 CM
AV INDEX (PROSTH): 0.8
AV MEAN GRADIENT: 8 MMHG
AV PEAK GRADIENT: 15 MMHG
AV VALVE AREA BY VELOCITY RATIO: 2.3 CM²
AV VALVE AREA: 2.24 CM²
AV VELOCITY RATIO: 0.82
BSA FOR ECHO PROCEDURE: 1.84 M2
CV ECHO LV RWT: 0.5 CM
DOP CALC AO PEAK VEL: 1.95 M/S
DOP CALC AO VTI: 42.8 CM
DOP CALC LVOT AREA: 2.8 CM2
DOP CALC LVOT DIAMETER: 1.89 CM
DOP CALC LVOT PEAK VEL: 1.6 M/S
DOP CALC LVOT STROKE VOLUME: 95.9 CM3
DOP CALC RVOT PEAK VEL: 0.99 M/S
DOP CALC RVOT VTI: 23.7 CM
DOP CALCLVOT PEAK VEL VTI: 34.2 CM
E WAVE DECELERATION TIME: 195.94 MSEC
E/A RATIO: 1.2
E/E' RATIO: 13.38 M/S
ECHO LV POSTERIOR WALL: 0.86 CM (ref 0.6–1.1)
EJECTION FRACTION: 60 %
FRACTIONAL SHORTENING: 33 % (ref 28–44)
INTERVENTRICULAR SEPTUM: 0.99 CM (ref 0.6–1.1)
IVRT: 114.19 MSEC
LA MAJOR: 3.71 CM
LA MINOR: 3.84 CM
LA WIDTH: 4.5 CM
LEFT ATRIUM AREA SYSTOLIC (APICAL 2 CHAMBER): 12.12 CM2
LEFT ATRIUM AREA SYSTOLIC (APICAL 4 CHAMBER): 18.05 CM2
LEFT ATRIUM SIZE: 3.56 CM
LEFT ATRIUM VOLUME INDEX: 28.4 ML/M2
LEFT ATRIUM VOLUME: 51.39 CM3
LEFT INTERNAL DIMENSION IN SYSTOLE: 2.33 CM (ref 2.1–4)
LEFT VENTRICLE DIASTOLIC VOLUME INDEX: 27.25 ML/M2
LEFT VENTRICLE DIASTOLIC VOLUME: 49.33 ML
LEFT VENTRICLE END SYSTOLIC VOLUME APICAL 2 CHAMBER: 25.8 ML
LEFT VENTRICLE END SYSTOLIC VOLUME APICAL 4 CHAMBER: 52.88 ML
LEFT VENTRICLE MASS INDEX: 50 G/M2
LEFT VENTRICLE SYSTOLIC VOLUME INDEX: 10.3 ML/M2
LEFT VENTRICLE SYSTOLIC VOLUME: 18.68 ML
LEFT VENTRICULAR INTERNAL DIMENSION IN DIASTOLE: 3.46 CM (ref 3.5–6)
LEFT VENTRICULAR MASS: 90.71 G
LV LATERAL E/E' RATIO: 13.38 M/S
LV SEPTAL E/E' RATIO: 13.38 M/S
LVED V (TEICH): 49.33 ML
LVES V (TEICH): 18.68 ML
LVOT MG: 5.52 MMHG
LVOT MV: 1.09 CM/S
MV PEAK A VEL: 0.89 M/S
MV PEAK E VEL: 1.07 M/S
MV STENOSIS PRESSURE HALF TIME: 56.82 MS
MV VALVE AREA P 1/2 METHOD: 3.87 CM2
OHS CV RV/LV RATIO: 1 CM
PISA TR MAX VEL: 2.3 M/S
PULM VEIN S/D RATIO: 2.18
PV MEAN GRADIENT: 2 MMHG
PV PEAK D VEL: 0.38 M/S
PV PEAK GRADIENT: 4 MMHG
PV PEAK S VEL: 0.83 M/S
PV PEAK VELOCITY: 0.99 M/S
RA MAJOR: 3.27 CM
RA PRESSURE ESTIMATED: 3 MMHG
RA WIDTH: 3.06 CM
RIGHT VENTRICULAR END-DIASTOLIC DIMENSION: 3.45 CM
RV TB RVSP: 5 MMHG
SINUS: 2.7 CM
STJ: 2.82 CM
TDI LATERAL: 0.08 M/S
TDI SEPTAL: 0.08 M/S
TDI: 0.08 M/S
TR MAX PG: 21 MMHG
TV REST PULMONARY ARTERY PRESSURE: 24 MMHG
Z-SCORE OF LEFT VENTRICULAR DIMENSION IN END DIASTOLE: -3.7
Z-SCORE OF LEFT VENTRICULAR DIMENSION IN END SYSTOLE: -2.24

## 2024-08-26 PROCEDURE — 93306 TTE W/DOPPLER COMPLETE: CPT | Mod: PO

## 2024-08-26 PROCEDURE — 93306 TTE W/DOPPLER COMPLETE: CPT | Mod: 26,,, | Performed by: INTERNAL MEDICINE

## 2024-08-26 PROCEDURE — 77080 DXA BONE DENSITY AXIAL: CPT | Mod: 26,,, | Performed by: RADIOLOGY

## 2024-08-26 PROCEDURE — 77080 DXA BONE DENSITY AXIAL: CPT | Mod: TC,PO

## 2024-08-27 DIAGNOSIS — M85.852 OSTEOPENIA OF NECK OF LEFT FEMUR: Primary | ICD-10-CM

## 2024-08-27 RX ORDER — LYSINE HCL 500 MG
1 TABLET ORAL DAILY
Qty: 90 TABLET | Refills: 1 | Status: SHIPPED | OUTPATIENT
Start: 2024-08-27 | End: 2025-02-23

## 2024-08-28 ENCOUNTER — TELEPHONE (OUTPATIENT)
Dept: INTERNAL MEDICINE | Facility: CLINIC | Age: 79
End: 2024-08-28
Payer: MEDICARE

## 2024-08-28 NOTE — TELEPHONE ENCOUNTER
Called and spoke to pharmacist Negar who wanted to know if it was ok to change from Calcium carbonate-vit D3-min 600 mg calcium- 400 unit Tab to the 600(Calcium) -200 (Vit D3).  Per Dr Mayer it was ok to do so. Negar voiced understanding.

## 2024-10-25 ENCOUNTER — TELEPHONE (OUTPATIENT)
Dept: INTERNAL MEDICINE | Facility: CLINIC | Age: 79
End: 2024-10-25
Payer: MEDICARE

## 2024-10-25 NOTE — TELEPHONE ENCOUNTER
----- Message from [place] sent at 10/25/2024 11:11 AM CDT -----  Name of Caller:Patient's daughter, Eva  Best Call Back Number:.[phone]   Additional Information: She is requesting a call back from Eva regarding calcium infusion.   Sent patient portal

## 2024-11-12 ENCOUNTER — OFFICE VISIT (OUTPATIENT)
Dept: INTERNAL MEDICINE | Facility: CLINIC | Age: 79
End: 2024-11-12
Payer: MEDICARE

## 2024-11-12 ENCOUNTER — HOSPITAL ENCOUNTER (OUTPATIENT)
Dept: RADIOLOGY | Facility: HOSPITAL | Age: 79
Discharge: HOME | End: 2024-11-12
Attending: STUDENT IN AN ORGANIZED HEALTH CARE EDUCATION/TRAINING PROGRAM
Payer: MEDICARE

## 2024-11-12 ENCOUNTER — PATIENT MESSAGE (OUTPATIENT)
Dept: INTERNAL MEDICINE | Facility: CLINIC | Age: 79
End: 2024-11-12

## 2024-11-12 VITALS
HEIGHT: 64 IN | TEMPERATURE: 97 F | OXYGEN SATURATION: 99 % | WEIGHT: 166.25 LBS | DIASTOLIC BLOOD PRESSURE: 68 MMHG | SYSTOLIC BLOOD PRESSURE: 120 MMHG | HEART RATE: 72 BPM | BODY MASS INDEX: 28.38 KG/M2

## 2024-11-12 DIAGNOSIS — M79.89 LEG SWELLING: ICD-10-CM

## 2024-11-12 DIAGNOSIS — R60.1 GENERALIZED EDEMA: ICD-10-CM

## 2024-11-12 DIAGNOSIS — G89.29 CHRONIC BILATERAL LOW BACK PAIN WITHOUT SCIATICA: Primary | ICD-10-CM

## 2024-11-12 DIAGNOSIS — M54.50 CHRONIC BILATERAL LOW BACK PAIN WITHOUT SCIATICA: Primary | ICD-10-CM

## 2024-11-12 PROCEDURE — 99999 PR PBB SHADOW E&M-EST. PATIENT-LVL IV: CPT | Mod: PBBFAC,,, | Performed by: STUDENT IN AN ORGANIZED HEALTH CARE EDUCATION/TRAINING PROGRAM

## 2024-11-12 PROCEDURE — 99214 OFFICE O/P EST MOD 30 MIN: CPT | Mod: S$GLB,,, | Performed by: STUDENT IN AN ORGANIZED HEALTH CARE EDUCATION/TRAINING PROGRAM

## 2024-11-12 PROCEDURE — 1125F AMNT PAIN NOTED PAIN PRSNT: CPT | Mod: CPTII,S$GLB,, | Performed by: STUDENT IN AN ORGANIZED HEALTH CARE EDUCATION/TRAINING PROGRAM

## 2024-11-12 PROCEDURE — 3074F SYST BP LT 130 MM HG: CPT | Mod: CPTII,S$GLB,, | Performed by: STUDENT IN AN ORGANIZED HEALTH CARE EDUCATION/TRAINING PROGRAM

## 2024-11-12 PROCEDURE — 3078F DIAST BP <80 MM HG: CPT | Mod: CPTII,S$GLB,, | Performed by: STUDENT IN AN ORGANIZED HEALTH CARE EDUCATION/TRAINING PROGRAM

## 2024-11-12 PROCEDURE — 3288F FALL RISK ASSESSMENT DOCD: CPT | Mod: CPTII,S$GLB,, | Performed by: STUDENT IN AN ORGANIZED HEALTH CARE EDUCATION/TRAINING PROGRAM

## 2024-11-12 PROCEDURE — G2211 COMPLEX E/M VISIT ADD ON: HCPCS | Mod: S$GLB,,, | Performed by: STUDENT IN AN ORGANIZED HEALTH CARE EDUCATION/TRAINING PROGRAM

## 2024-11-12 PROCEDURE — 1101F PT FALLS ASSESS-DOCD LE1/YR: CPT | Mod: CPTII,S$GLB,, | Performed by: STUDENT IN AN ORGANIZED HEALTH CARE EDUCATION/TRAINING PROGRAM

## 2024-11-12 PROCEDURE — 1160F RVW MEDS BY RX/DR IN RCRD: CPT | Mod: CPTII,S$GLB,, | Performed by: STUDENT IN AN ORGANIZED HEALTH CARE EDUCATION/TRAINING PROGRAM

## 2024-11-12 PROCEDURE — 1159F MED LIST DOCD IN RCRD: CPT | Mod: CPTII,S$GLB,, | Performed by: STUDENT IN AN ORGANIZED HEALTH CARE EDUCATION/TRAINING PROGRAM

## 2024-11-12 NOTE — PROGRESS NOTES
Chief Complaint   Patient presents with    Leg Swelling    Tremors    Back Pain     HPI: Tessa Cardenas is a 79 y.o. female  with Pmhx listed below who presents to clinic for leg swelling, tremors and back pain.    History of Present Illness    CHIEF COMPLAINT:  - Ms. Cardenas presents for a follow-up visit regarding ongoing back pain and leg swelling, with additional concerns about tremors.    HPI:  Ms. Cardenas was last seen on August 12th for lower back pain, which persists. An x-ray at that time revealed arthritis or degenerative disc disease, more prominent in the lumbar region. Physical therapy was recommended, but the patient has not yet received treatment due to insurance issues and personal circumstances.    Ms. Cardenas has tremors that have been ongoing for years and are more pronounced on the left side. She has an upcoming appointment with neurology next month to address this issue. Recently, the patient almost dropped a plate due to her tremors, as reported by her sister.    Ms. Cardenas reports occasional memory issues and anxiety, particularly in large groups of people, describing herself as mildly irritable. It is unclear if the tremors worsen during periods of anxiety.    Ms. Cardenas recently had leg swelling that lasted for about a week. The swelling was present in the morning and improved after increasing fluid intake. She has a history of blood clots, which raises concerns about the cause of the swelling.    Ms. Cardenas has a history of H. pylori, for which she received treatment. During this time, she became dehydrated and developed a UTI.    Ms. Cardenas denies dark, brown, or red urine and pelvic pain.           Problem List:  There is no problem list on file for this patient.      ROS: Negative except as noted above.       Current Meds:  Current Outpatient Medications   Medication Sig Dispense Refill    calcium carbonate-vit D3-min 600 mg calcium- 400 unit Tab Take 1 tablet by mouth once daily. 90 tablet 1     meloxicam (MOBIC) 15 MG tablet Take 15 mg by mouth.      simvastatin (ZOCOR) 20 MG tablet Take 1 tablet (20 mg total) by mouth every evening. 90 tablet 0    sertraline (ZOLOFT) 25 MG tablet Take 1 tablet (25 mg total) by mouth once daily. (Patient not taking: Reported on 11/12/2024) 30 tablet 2     No current facility-administered medications for this visit.      PE:  BP: 120/68  Pulse: 72     Temp: 97 °F (36.1 °C)  Weight: 75.4 kg (166 lb 3.6 oz) Body mass index is 28.53 kg/m².    Wt Readings from Last 5 Encounters:   11/12/24 75.4 kg (166 lb 3.6 oz)   08/26/24 75.3 kg (166 lb)   08/12/24 75.3 kg (166 lb 0.1 oz)   03/30/19 80.5 kg (177 lb 7.5 oz)   01/31/19 81.1 kg (178 lb 12.7 oz)     General appearance: alert and cooperative, not in acute distress  Head: normocephalic, without obvious abnormality, atraumatic  Eyes: conjunctivae/corneas clear. PERRL, EOM's intact.  Ears: clear tympanic membranes   Neck: no adenopathy, supple, symmetrical, trachea midline and thyroid not enlarged, symmetric, no tenderness/mass/nodules, no JVD  Throat: lips, mucosa, and tongue normal; teeth and gums normal; no thrush  Chest: no reproducible chest pain   Heart: regular rate and rhythm, S1, S2 normal, no murmur, click, rub or gallop  Lungs: unlabored respiration, bilateral equal air entry, normal vesicular breath sound heard, no wheezing, rhonchi   Abdomen: soft, non-tender, non-distended; bowel sounds +; no masses,  no organomegaly, no ascites   Extremities: normal, atraumatic, no cyanosis or edema noted B/L upper and lower extremities.  Skin: skin color, texture, turgor normal. No rashes or lesions noted.  Neurologic: grossly intact      Lab:  Lab Results   Component Value Date    WBC 8.35 08/13/2024    HGB 13.4 08/13/2024    HCT 40.3 08/13/2024    MCV 87 08/13/2024     08/13/2024     11/12/2024    K 5.3 (H) 11/12/2024     11/12/2024    CO2 25 11/12/2024    BUN 11 11/12/2024    GLU 93 11/12/2024    CALCIUM 9.8  11/12/2024    AST 21 11/12/2024    ALT 14 11/12/2024    CHOL 239 (H) 08/13/2024    HDL 84 (H) 08/13/2024    LDLCALC 144.6 08/13/2024    TRIG 52 08/13/2024    TSH 2.099 08/13/2024       Impression:    ICD-10-CM ICD-9-CM    1. Chronic bilateral low back pain without sciatica  M54.50 724.2 Ambulatory referral/consult to Physical/Occupational Therapy    G89.29 338.29       2. Leg swelling  M79.89 729.81 COMPREHENSIVE METABOLIC PANEL      Microalbumin/creatinine urine ratio      US Lower Extremity Veins Bilateral      3. Generalized edema  R60.1 782.3 US Lower Extremity Veins Bilateral          Assessment & Plan    BACK PAIN:  Reviewed prior back pain workup, including x-ray showing arthritis/degenerative disc disease in lumbar region.  Referred to Physical Therapy for back pain, twice weekly for 6 weeks.    TREMORS:  Assessed ongoing tremors, noting persistence for years and upcoming neurology evaluation.    LEG SWELLING:  Evaluated recent leg swelling episode, considering possible causes including fluid balance and prior DVT history.  Planning to check urine protein and repeat CMP to investigate leg swelling etiology.  Considering leg ultrasound to rule out recurrent DVT if other tests are normal.  Evaluating need for compression stockings if other tests are normal.  Ms. Serf to increase fluid intake to help manage leg swelling.  Urinalysis to check for protein ordered.  CMP ordered.  Leg ultrasound to be ordered if other tests normal.    OTHER INSTRUCTIONS:  Analyzed recent diagnostic results: normal echocardiogram, stable labs including normal CBC, kidney function, thyroid, and liver function.          1. Chronic bilateral low back pain without sciatica (Primary)  - Ambulatory referral/consult to Physical/Occupational Therapy; Future    2. Leg swelling  - COMPREHENSIVE METABOLIC PANEL; Future  - Microalbumin/creatinine urine ratio; Future  - US Lower Extremity Veins Bilateral; Future    3. Generalized edema  - US  Lower Extremity Veins Bilateral; Future  Echo on 8/26/2024: normal    Future Appointments   Date Time Provider Department Center   12/24/2024  1:00 PM Barbra Maynard NP ON NEUR[facility]  Medical    5/12/2025  7:40 AM [facility]       I spent a total of 33 minutes on the day of the visit.This includes face to face time and non-face to face time preparing to see the patient (eg, review of tests), obtaining and/or reviewing separately obtained history, documenting clinical information in the electronic or other health record, independently interpreting results and communicating results to the patient/family/caregiver, or care coordinator.  Visit today included increased complexity associated with the care of the episodic problem   addressed and managing the longitudinal care of the patient due to the serious and/or complex managed problem(s) .     Diaz Mayer MD    This note was generated with the assistance of ambient listening technology. Verbal consent was obtained by the patient and accompanying visitor(s) for the recording of patient appointment to facilitate this note. I attest to having reviewed and edited the generated note for accuracy, though some syntax or spelling errors may persist. Please contact the author of this note for any clarification.

## 2024-11-14 ENCOUNTER — HOSPITAL ENCOUNTER (OUTPATIENT)
Dept: RADIOLOGY | Facility: HOSPITAL | Age: 79
Discharge: HOME | End: 2024-11-14
Attending: STUDENT IN AN ORGANIZED HEALTH CARE EDUCATION/TRAINING PROGRAM
Payer: MEDICARE

## 2024-11-14 DIAGNOSIS — G89.29 CHRONIC BILATERAL LOW BACK PAIN WITHOUT SCIATICA: ICD-10-CM

## 2024-11-14 DIAGNOSIS — M54.50 CHRONIC BILATERAL LOW BACK PAIN WITHOUT SCIATICA: ICD-10-CM

## 2024-11-14 PROCEDURE — 72100 X-RAY EXAM L-S SPINE 2/3 VWS: CPT | Mod: 26,,, | Performed by: STUDENT IN AN ORGANIZED HEALTH CARE EDUCATION/TRAINING PROGRAM

## 2024-11-14 PROCEDURE — 72100 X-RAY EXAM L-S SPINE 2/3 VWS: CPT | Mod: TC

## 2024-11-27 ENCOUNTER — TELEPHONE (OUTPATIENT)
Dept: INTERNAL MEDICINE | Facility: CLINIC | Age: 79
End: 2024-11-27
Payer: MEDICARE

## 2024-11-27 NOTE — TELEPHONE ENCOUNTER
----- Message from Marko sent at 11/27/2024  8:40 AM CST -----  Contact: Pushpa Wilson PT  .Type: Return Call    Who Called: Pushpa  Would the patient rather a call back or a response via [facility]? Call back  Best Call Back Number:[phone]  Additional Information: Pushpa is calling in regards to a fax she received.

## 2024-12-12 ENCOUNTER — PATIENT MESSAGE (OUTPATIENT)
Dept: NEUROLOGY | Facility: CLINIC | Age: 79
End: 2024-12-12
Payer: MEDICARE

## 2024-12-16 ENCOUNTER — TELEPHONE (OUTPATIENT)
Dept: NEUROLOGY | Facility: CLINIC | Age: 79
End: 2024-12-16
Payer: MEDICARE

## 2024-12-16 NOTE — TELEPHONE ENCOUNTER
----- Message from Olga sent at 12/16/2024 10:04 AM CST -----  Contact: Daughter Abby [phone]  Patient is returning a phone call.    Who left a message for the patient: nurse     Does patient know what this is regarding:  r/s upcoming appt     Would you like a call back, or a response through your [facility] portal?:   call back    Comments: When trying to r/s jasen, next appt was not until March and daughter is asking if pt can get a sooner appt.

## 2025-02-25 ENCOUNTER — TELEPHONE (OUTPATIENT)
Facility: CLINIC | Age: 80
End: 2025-02-25
Payer: MEDICARE

## 2025-04-15 ENCOUNTER — PATIENT MESSAGE (OUTPATIENT)
Dept: NEUROLOGY | Facility: CLINIC | Age: 80
End: 2025-04-15
Payer: MEDICARE

## 2025-06-03 ENCOUNTER — OFFICE VISIT (OUTPATIENT)
Dept: INTERNAL MEDICINE | Facility: CLINIC | Age: 80
End: 2025-06-03
Payer: MEDICARE

## 2025-06-03 ENCOUNTER — LAB VISIT (OUTPATIENT)
Dept: LAB | Facility: HOSPITAL | Age: 80
End: 2025-06-03
Attending: STUDENT IN AN ORGANIZED HEALTH CARE EDUCATION/TRAINING PROGRAM
Payer: MEDICARE

## 2025-06-03 VITALS
DIASTOLIC BLOOD PRESSURE: 74 MMHG | OXYGEN SATURATION: 98 % | BODY MASS INDEX: 28.12 KG/M2 | HEIGHT: 64 IN | HEART RATE: 64 BPM | WEIGHT: 164.69 LBS | SYSTOLIC BLOOD PRESSURE: 124 MMHG | TEMPERATURE: 97 F

## 2025-06-03 DIAGNOSIS — F32.A DEPRESSION, UNSPECIFIED DEPRESSION TYPE: ICD-10-CM

## 2025-06-03 DIAGNOSIS — Z76.89 ENCOUNTER TO ESTABLISH CARE: ICD-10-CM

## 2025-06-03 DIAGNOSIS — F03.90 DEMENTIA WITHOUT BEHAVIORAL DISTURBANCE: Primary | ICD-10-CM

## 2025-06-03 DIAGNOSIS — G89.29 CHRONIC BILATERAL LOW BACK PAIN WITHOUT SCIATICA: ICD-10-CM

## 2025-06-03 DIAGNOSIS — M85.852 OSTEOPENIA OF NECK OF LEFT FEMUR: ICD-10-CM

## 2025-06-03 DIAGNOSIS — R79.9 ABNORMAL FINDING OF BLOOD CHEMISTRY, UNSPECIFIED: ICD-10-CM

## 2025-06-03 DIAGNOSIS — R06.09 DYSPNEA ON EXERTION: ICD-10-CM

## 2025-06-03 DIAGNOSIS — M54.50 CHRONIC BILATERAL LOW BACK PAIN WITHOUT SCIATICA: ICD-10-CM

## 2025-06-03 DIAGNOSIS — I10 PRIMARY HYPERTENSION: ICD-10-CM

## 2025-06-03 LAB
ABSOLUTE EOSINOPHIL (OHS): 0.13 K/UL
ABSOLUTE MONOCYTE (OHS): 0.61 K/UL (ref 0.3–1)
ABSOLUTE NEUTROPHIL COUNT (OHS): 4.38 K/UL (ref 1.8–7.7)
ALBUMIN SERPL BCP-MCNC: 4.5 G/DL (ref 3.5–5.2)
ALP SERPL-CCNC: 88 UNIT/L (ref 40–150)
ALT SERPL W/O P-5'-P-CCNC: 17 UNIT/L (ref 10–44)
ANION GAP (OHS): 13 MMOL/L (ref 8–16)
AST SERPL-CCNC: 20 UNIT/L (ref 11–45)
BASOPHILS # BLD AUTO: 0.06 K/UL
BASOPHILS NFR BLD AUTO: 0.8 %
BILIRUB SERPL-MCNC: 0.8 MG/DL (ref 0.1–1)
BUN SERPL-MCNC: 16 MG/DL (ref 8–23)
CALCIUM SERPL-MCNC: 10 MG/DL (ref 8.7–10.5)
CHLORIDE SERPL-SCNC: 106 MMOL/L (ref 95–110)
CHOLEST SERPL-MCNC: 270 MG/DL (ref 120–199)
CHOLEST/HDLC SERPL: 2.8 {RATIO} (ref 2–5)
CO2 SERPL-SCNC: 21 MMOL/L (ref 23–29)
CREAT SERPL-MCNC: 0.8 MG/DL (ref 0.5–1.4)
EAG (OHS): 105 MG/DL (ref 68–131)
ERYTHROCYTE [DISTWIDTH] IN BLOOD BY AUTOMATED COUNT: 15.4 % (ref 11.5–14.5)
GFR SERPLBLD CREATININE-BSD FMLA CKD-EPI: >60 ML/MIN/1.73/M2
GLUCOSE SERPL-MCNC: 87 MG/DL (ref 70–110)
HBA1C MFR BLD: 5.3 % (ref 4–5.6)
HCT VFR BLD AUTO: 40.5 % (ref 37–48.5)
HDLC SERPL-MCNC: 96 MG/DL (ref 40–75)
HDLC SERPL: 35.6 % (ref 20–50)
HGB BLD-MCNC: 13.6 GM/DL (ref 12–16)
IMM GRANULOCYTES # BLD AUTO: 0.01 K/UL (ref 0–0.04)
IMM GRANULOCYTES NFR BLD AUTO: 0.1 % (ref 0–0.5)
LDLC SERPL CALC-MCNC: 165.6 MG/DL (ref 63–159)
LYMPHOCYTES # BLD AUTO: 2 K/UL (ref 1–4.8)
MCH RBC QN AUTO: 28.3 PG (ref 27–31)
MCHC RBC AUTO-ENTMCNC: 33.6 G/DL (ref 32–36)
MCV RBC AUTO: 84 FL (ref 82–98)
NONHDLC SERPL-MCNC: 174 MG/DL
NUCLEATED RBC (/100WBC) (OHS): 0 /100 WBC
PLATELET # BLD AUTO: 257 K/UL (ref 150–450)
PMV BLD AUTO: 10.4 FL (ref 9.2–12.9)
POTASSIUM SERPL-SCNC: 4.4 MMOL/L (ref 3.5–5.1)
PROT SERPL-MCNC: 9 GM/DL (ref 6–8.4)
RBC # BLD AUTO: 4.8 M/UL (ref 4–5.4)
RELATIVE EOSINOPHIL (OHS): 1.8 %
RELATIVE LYMPHOCYTE (OHS): 27.8 % (ref 18–48)
RELATIVE MONOCYTE (OHS): 8.5 % (ref 4–15)
RELATIVE NEUTROPHIL (OHS): 61 % (ref 38–73)
SODIUM SERPL-SCNC: 140 MMOL/L (ref 136–145)
TRIGL SERPL-MCNC: 42 MG/DL (ref 30–150)
TSH SERPL-ACNC: 1.81 UIU/ML (ref 0.4–4)
WBC # BLD AUTO: 7.19 K/UL (ref 3.9–12.7)

## 2025-06-03 PROCEDURE — 83036 HEMOGLOBIN GLYCOSYLATED A1C: CPT

## 2025-06-03 PROCEDURE — 82465 ASSAY BLD/SERUM CHOLESTEROL: CPT

## 2025-06-03 PROCEDURE — 3288F FALL RISK ASSESSMENT DOCD: CPT | Mod: CPTII,S$GLB,, | Performed by: STUDENT IN AN ORGANIZED HEALTH CARE EDUCATION/TRAINING PROGRAM

## 2025-06-03 PROCEDURE — 99214 OFFICE O/P EST MOD 30 MIN: CPT | Mod: S$GLB,,, | Performed by: STUDENT IN AN ORGANIZED HEALTH CARE EDUCATION/TRAINING PROGRAM

## 2025-06-03 PROCEDURE — 99999 PR PBB SHADOW E&M-EST. PATIENT-LVL IV: CPT | Mod: PBBFAC,,, | Performed by: STUDENT IN AN ORGANIZED HEALTH CARE EDUCATION/TRAINING PROGRAM

## 2025-06-03 PROCEDURE — 36415 COLL VENOUS BLD VENIPUNCTURE: CPT | Mod: PO

## 2025-06-03 PROCEDURE — 1160F RVW MEDS BY RX/DR IN RCRD: CPT | Mod: CPTII,S$GLB,, | Performed by: STUDENT IN AN ORGANIZED HEALTH CARE EDUCATION/TRAINING PROGRAM

## 2025-06-03 PROCEDURE — 3078F DIAST BP <80 MM HG: CPT | Mod: CPTII,S$GLB,, | Performed by: STUDENT IN AN ORGANIZED HEALTH CARE EDUCATION/TRAINING PROGRAM

## 2025-06-03 PROCEDURE — G2211 COMPLEX E/M VISIT ADD ON: HCPCS | Mod: S$GLB,,, | Performed by: STUDENT IN AN ORGANIZED HEALTH CARE EDUCATION/TRAINING PROGRAM

## 2025-06-03 PROCEDURE — 1101F PT FALLS ASSESS-DOCD LE1/YR: CPT | Mod: CPTII,S$GLB,, | Performed by: STUDENT IN AN ORGANIZED HEALTH CARE EDUCATION/TRAINING PROGRAM

## 2025-06-03 PROCEDURE — 82040 ASSAY OF SERUM ALBUMIN: CPT | Mod: PO

## 2025-06-03 PROCEDURE — 3074F SYST BP LT 130 MM HG: CPT | Mod: CPTII,S$GLB,, | Performed by: STUDENT IN AN ORGANIZED HEALTH CARE EDUCATION/TRAINING PROGRAM

## 2025-06-03 PROCEDURE — 85025 COMPLETE CBC W/AUTO DIFF WBC: CPT | Mod: PO

## 2025-06-03 PROCEDURE — 84443 ASSAY THYROID STIM HORMONE: CPT | Mod: PO

## 2025-06-03 PROCEDURE — 1159F MED LIST DOCD IN RCRD: CPT | Mod: CPTII,S$GLB,, | Performed by: STUDENT IN AN ORGANIZED HEALTH CARE EDUCATION/TRAINING PROGRAM

## 2025-06-03 PROCEDURE — 1125F AMNT PAIN NOTED PAIN PRSNT: CPT | Mod: CPTII,S$GLB,, | Performed by: STUDENT IN AN ORGANIZED HEALTH CARE EDUCATION/TRAINING PROGRAM

## 2025-06-03 RX ORDER — LYSINE HCL 500 MG
1 TABLET ORAL DAILY
Qty: 90 TABLET | Refills: 1 | Status: SHIPPED | OUTPATIENT
Start: 2025-06-03 | End: 2025-06-05 | Stop reason: SDUPTHER

## 2025-06-03 RX ORDER — LISINOPRIL 5 MG/1
5 TABLET ORAL
COMMUNITY
Start: 2025-05-29

## 2025-06-04 ENCOUNTER — RESULTS FOLLOW-UP (OUTPATIENT)
Dept: INTERNAL MEDICINE | Facility: CLINIC | Age: 80
End: 2025-06-04
Payer: MEDICARE

## 2025-06-04 DIAGNOSIS — E78.5 DYSLIPIDEMIA: Primary | ICD-10-CM

## 2025-06-04 RX ORDER — ROSUVASTATIN CALCIUM 20 MG/1
20 TABLET, COATED ORAL DAILY
Qty: 90 TABLET | Refills: 3 | Status: SHIPPED | OUTPATIENT
Start: 2025-06-04 | End: 2026-06-04

## 2025-06-05 DIAGNOSIS — M85.852 OSTEOPENIA OF NECK OF LEFT FEMUR: ICD-10-CM

## 2025-06-06 RX ORDER — LYSINE HCL 500 MG
1 TABLET ORAL DAILY
Qty: 90 TABLET | Refills: 1 | Status: SHIPPED | OUTPATIENT
Start: 2025-06-06 | End: 2025-12-03

## 2025-06-07 DIAGNOSIS — E78.5 DYSLIPIDEMIA: ICD-10-CM

## 2025-06-07 NOTE — TELEPHONE ENCOUNTER
No care due was identified.  [facility] Embedded Care Due Messages. Reference number: [id number].   6/07/2025 6:39:13 PM CDT

## 2025-06-09 RX ORDER — ROSUVASTATIN CALCIUM 20 MG/1
20 TABLET, COATED ORAL DAILY
Qty: 90 TABLET | Refills: 3 | Status: SHIPPED | OUTPATIENT
Start: 2025-06-09 | End: 2026-06-09